# Patient Record
Sex: FEMALE | Race: WHITE | URBAN - METROPOLITAN AREA
[De-identification: names, ages, dates, MRNs, and addresses within clinical notes are randomized per-mention and may not be internally consistent; named-entity substitution may affect disease eponyms.]

---

## 2024-05-28 ENCOUNTER — APPOINTMENT (OUTPATIENT)
Dept: GENERAL RADIOLOGY | Age: 59
End: 2024-05-28
Payer: COMMERCIAL

## 2024-05-28 ENCOUNTER — HOSPITAL ENCOUNTER (EMERGENCY)
Age: 59
Discharge: HOME | End: 2024-05-28
Attending: EMERGENCY MEDICINE
Payer: COMMERCIAL

## 2024-05-28 VITALS
HEIGHT: 67 IN | OXYGEN SATURATION: 99 % | DIASTOLIC BLOOD PRESSURE: 111 MMHG | HEART RATE: 97 BPM | WEIGHT: 222 LBS | SYSTOLIC BLOOD PRESSURE: 145 MMHG | TEMPERATURE: 98.1 F | BODY MASS INDEX: 34.84 KG/M2 | RESPIRATION RATE: 18 BRPM

## 2024-05-28 DIAGNOSIS — S93.601A RIGHT FOOT SPRAIN, INITIAL ENCOUNTER: ICD-10-CM

## 2024-05-28 DIAGNOSIS — S90.31XA CONTUSION OF RIGHT FOOT, INITIAL ENCOUNTER: Primary | ICD-10-CM

## 2024-05-28 PROCEDURE — 99283 EMERGENCY DEPT VISIT LOW MDM: CPT

## 2024-05-28 PROCEDURE — 6370000000 HC RX 637 (ALT 250 FOR IP): Performed by: EMERGENCY MEDICINE

## 2024-05-28 PROCEDURE — 73630 X-RAY EXAM OF FOOT: CPT

## 2024-05-28 RX ORDER — BENZTROPINE MESYLATE 1 MG/1
1 TABLET ORAL 2 TIMES DAILY
COMMUNITY

## 2024-05-28 RX ORDER — IBUPROFEN 800 MG/1
800 TABLET ORAL ONCE
Status: COMPLETED | OUTPATIENT
Start: 2024-05-28 | End: 2024-05-28

## 2024-05-28 RX ORDER — OXYCODONE HYDROCHLORIDE AND ACETAMINOPHEN 5; 325 MG/1; MG/1
1 TABLET ORAL ONCE
Status: COMPLETED | OUTPATIENT
Start: 2024-05-28 | End: 2024-05-28

## 2024-05-28 RX ORDER — IBUPROFEN 800 MG/1
800 TABLET ORAL EVERY 8 HOURS PRN
Qty: 20 TABLET | Refills: 0 | Status: SHIPPED | OUTPATIENT
Start: 2024-05-28 | End: 2024-06-07

## 2024-05-28 RX ORDER — LORAZEPAM 1 MG/1
1 TABLET ORAL EVERY 6 HOURS PRN
COMMUNITY

## 2024-05-28 RX ORDER — HYDROCODONE BITARTRATE AND ACETAMINOPHEN 5; 325 MG/1; MG/1
1 TABLET ORAL EVERY 8 HOURS PRN
Qty: 6 TABLET | Refills: 0 | Status: SHIPPED | OUTPATIENT
Start: 2024-05-28 | End: 2024-05-30

## 2024-05-28 RX ADMIN — OXYCODONE HYDROCHLORIDE AND ACETAMINOPHEN 1 TABLET: 5; 325 TABLET ORAL at 20:47

## 2024-05-28 RX ADMIN — IBUPROFEN 800 MG: 800 TABLET, FILM COATED ORAL at 21:52

## 2024-05-29 NOTE — ED PROVIDER NOTES
[facility] EMERGENCY DEPT VISIT      Patient Identification  Denisse Carey is a 58 y.o. female.    Chief Complaint   Foot Injury      History of Present Illness:    History was obtained from patient.  Limitations to history:none  This is a  58 y.o. female who presents ambulatory  to the ED with complaints of right foot pain after falling at 11am today. She was stepping off a ledge and missed footing and fell.  Patient states that she did not hit her head or lose consciousness.  She denies any headache, neck pain, back pain, rib pain, upper extremity complaints, hip or knee pain.  She states that hurts to bear weight with her right foot.  It has become swollen and bruised.  She is not on blood thinners.  She has taken no medication for the pain.    Past Medical History:   Diagnosis Date    Depression        Past Surgical History:   Procedure Laterality Date    SINUS SURGERY           Current Facility-Administered Medications:     ibuprofen (ADVIL;MOTRIN) tablet 800 mg, 800 mg, Oral, Once, Savannah Blount MD    Current Outpatient Medications:     LORazepam (ATIVAN) 1 MG tablet, Take 1 tablet by mouth every 6 hours as needed for Anxiety. Max Daily Amount: 4 mg, Disp: , Rfl:     benztropine (COGENTIN) 1 MG tablet, Take 1 tablet by mouth 2 times daily, Disp: , Rfl:     ibuprofen (IBU) 800 MG tablet, Take 1 tablet by mouth every 8 hours as needed for Pain, Disp: 20 tablet, Rfl: 0    HYDROcodone-acetaminophen (NORCO) 5-325 MG per tablet, Take 1 tablet by mouth every 8 hours as needed for Pain for up to 2 days. Intended supply: 2 days. Take lowest dose possible to manage pain Max Daily Amount: 3 tablets, Disp: 6 tablet, Rfl: 0    Allergies   Allergen Reactions    Aripiprazole Other (See Comments)     Pt does not remember    Clindamycin/Lincomycin Other (See Comments)     Upset stomach        Social History     Socioeconomic History    Marital status: Single     Spouse name: Not on file    Number of children: Not on file

## 2024-05-29 NOTE — ED TRIAGE NOTES
59y/o female presents to the ED with right foot injury this evening. Pt states she stepped off a ledge and twisted her foot. +swelling, bruising, and pain.

## 2024-05-29 NOTE — DISCHARGE INSTRUCTIONS
Ice and elevate as much as possible.  Orthotic boot when walking for the next week, if not improving followup with orthopedics.

## 2025-02-12 ENCOUNTER — HOSPITAL ENCOUNTER (EMERGENCY)
Age: 60
Discharge: HOME | End: 2025-02-12
Attending: EMERGENCY MEDICINE
Payer: MEDICARE

## 2025-02-12 ENCOUNTER — APPOINTMENT (OUTPATIENT)
Dept: GENERAL RADIOLOGY | Age: 60
End: 2025-02-12
Payer: MEDICARE

## 2025-02-12 VITALS
TEMPERATURE: 97.6 F | OXYGEN SATURATION: 98 % | DIASTOLIC BLOOD PRESSURE: 83 MMHG | HEIGHT: 67 IN | BODY MASS INDEX: 34.37 KG/M2 | RESPIRATION RATE: 20 BRPM | HEART RATE: 87 BPM | WEIGHT: 219 LBS | SYSTOLIC BLOOD PRESSURE: 159 MMHG

## 2025-02-12 DIAGNOSIS — J06.9 ACUTE UPPER RESPIRATORY INFECTION: Primary | ICD-10-CM

## 2025-02-12 LAB — S PYO AG THROAT QL: NEGATIVE

## 2025-02-12 PROCEDURE — 71046 X-RAY EXAM CHEST 2 VIEWS: CPT

## 2025-02-12 PROCEDURE — 99284 EMERGENCY DEPT VISIT MOD MDM: CPT

## 2025-02-12 PROCEDURE — 70360 X-RAY EXAM OF NECK: CPT

## 2025-02-12 PROCEDURE — 6370000000 HC RX 637 (ALT 250 FOR IP): Performed by: EMERGENCY MEDICINE

## 2025-02-12 PROCEDURE — 87880 STREP A ASSAY W/OPTIC: CPT

## 2025-02-12 RX ORDER — CETIRIZINE HYDROCHLORIDE 10 MG/1
10 TABLET ORAL DAILY
Qty: 30 TABLET | Refills: 0 | Status: SHIPPED | OUTPATIENT
Start: 2025-02-12

## 2025-02-12 RX ORDER — BENZONATATE 100 MG/1
100 CAPSULE ORAL 3 TIMES DAILY PRN
Qty: 30 CAPSULE | Refills: 0 | Status: SHIPPED | OUTPATIENT
Start: 2025-02-12 | End: 2025-02-19

## 2025-02-12 RX ADMIN — AMOXICILLIN AND CLAVULANATE POTASSIUM 1 TABLET: 875; 125 TABLET, FILM COATED ORAL at 22:02

## 2025-02-12 ASSESSMENT — LIFESTYLE VARIABLES
HOW OFTEN DO YOU HAVE A DRINK CONTAINING ALCOHOL: NEVER
HOW MANY STANDARD DRINKS CONTAINING ALCOHOL DO YOU HAVE ON A TYPICAL DAY: PATIENT DOES NOT DRINK

## 2025-02-13 NOTE — ED PROVIDER NOTES
Diastolic BP Percentile Temp Temp Source Pulse Respirations SpO2   (!) 159/83 -- -- 97.6 °F (36.4 °C) Oral 87 20 98 %      Height Weight - Scale         -- 99.3 kg (219 lb)             Physical Exam      General Appearance:  Alert, cooperative, no distress, appears stated age.   Head:  Normocephalic, without obviousabnormality, atraumatic.   Eyes:  conjunctiva/corneas clear, EOM's intact.  Sclera anicteric.   ENT: Mucous membranes moist.  Tonsils are normal size   Neck: Supple, symmetrical, trachea midline, no adenopathy.  No jugular venous distention.  No stridor no anterior cervical lymphadenopathy   Lungs:   Clear to auscultation bilaterally, respirationsunlabored.  No rales, rhonchi or wheezes.   Chest Wall:  No tenderness.    Heart:  Regular rate and rhythm, S1 and S2 normal, no murmur, rub or gallop.   Abdomen:   Soft, non-tender, bowel sounds active,   no masses, no organomegaly.   Extremities: No edema, cords or calf tenderness.  Full range of motion.   Pulses: 2+ and symmetric   Skin: Turgor is normal, no rashes or lesions.   Neurologic: Alert and oriented X 3.No focal findings.  Motor grossly normal.  Speech clear, no drift, CN III-XII grossly intact,        DIAGNOSTIC RESULTS   LABS:    Labs Reviewed   STREP SCREEN GROUP A THROAT       All other labs were within normal range or not returned as of this dictation.    EKG: All EKG's are interpreted by the Emergency Department Physician who eithersigns or Co-signs this chart in the absence of a cardiologist.        RADIOLOGY:   Non-plain film images such as CT, Ultrasound and MRI are read by the radiologist. Plain radiographic images are visualized by myself.      *    Interpretation per the Radiologist below, if available at the time of this note:    XR NECK SOFT TISSUE   Final Result   No acute abnormality.         Electronically signed by Lewis Elaine      XR CHEST (2 VW)   Final Result      No acute pulmonary disease.         Electronically signed by Lewis

## 2025-02-13 NOTE — DISCHARGE INSTRUCTIONS
Discharge home  Tessalon Perles  Augmentin  Zyrtec  Follow-up with your family doctor or at the [facility] outpatient clinic